# Patient Record
Sex: MALE | Race: ASIAN | NOT HISPANIC OR LATINO | Employment: FULL TIME | URBAN - METROPOLITAN AREA
[De-identification: names, ages, dates, MRNs, and addresses within clinical notes are randomized per-mention and may not be internally consistent; named-entity substitution may affect disease eponyms.]

---

## 2023-10-05 ENCOUNTER — HOSPITAL ENCOUNTER (EMERGENCY)
Facility: HOSPITAL | Age: 38
Discharge: HOME | End: 2023-10-05
Attending: EMERGENCY MEDICINE | Admitting: EMERGENCY MEDICINE
Payer: COMMERCIAL

## 2023-10-05 VITALS
DIASTOLIC BLOOD PRESSURE: 78 MMHG | TEMPERATURE: 97.9 F | SYSTOLIC BLOOD PRESSURE: 118 MMHG | HEART RATE: 79 BPM | OXYGEN SATURATION: 96 % | RESPIRATION RATE: 16 BRPM

## 2023-10-05 DIAGNOSIS — M54.9 MUSCULOSKELETAL BACK PAIN: ICD-10-CM

## 2023-10-05 PROBLEM — T79.A29A: Status: ACTIVE | Noted: 2023-10-05

## 2023-10-05 PROBLEM — L72.3 SEBACEOUS CYST: Status: ACTIVE | Noted: 2023-10-05

## 2023-10-05 PROBLEM — G57.50 TARSAL TUNNEL SYNDROME: Status: ACTIVE | Noted: 2023-10-05

## 2023-10-05 PROBLEM — R22.9 SUBCUTANEOUS NODULE: Status: ACTIVE | Noted: 2023-10-05

## 2023-10-05 PROBLEM — L70.0 ACNE VULGARIS: Status: ACTIVE | Noted: 2023-10-05

## 2023-10-05 PROBLEM — E55.9 VITAMIN D DEFICIENCY: Status: ACTIVE | Noted: 2023-10-05

## 2023-10-05 PROBLEM — M79.673 PAIN OF FOOT: Status: ACTIVE | Noted: 2023-10-05

## 2023-10-05 PROBLEM — R00.0 TACHYCARDIA: Status: ACTIVE | Noted: 2023-10-05

## 2023-10-05 PROBLEM — F17.200 NICOTINE DEPENDENCE: Status: ACTIVE | Noted: 2023-10-05

## 2023-10-05 PROBLEM — R20.0 NUMBNESS: Status: ACTIVE | Noted: 2023-10-05

## 2023-10-05 PROBLEM — E66.3 OVERWEIGHT: Status: ACTIVE | Noted: 2023-10-05

## 2023-10-05 PROBLEM — L90.6 STRIAE ATROPHICAE: Status: ACTIVE | Noted: 2023-10-05

## 2023-10-05 PROBLEM — M79.606 PAIN OF LOWER EXTREMITY: Status: ACTIVE | Noted: 2023-10-05

## 2023-10-05 PROBLEM — M79.A29 NON-TRAUMATIC COMPARTMENT SYNDROME OF LOWER EXTREMITY: Status: ACTIVE | Noted: 2023-10-05

## 2023-10-05 PROBLEM — H52.229 REGULAR ASTIGMATISM: Status: ACTIVE | Noted: 2023-10-05

## 2023-10-05 PROBLEM — F17.200 CURRENT SMOKER: Status: ACTIVE | Noted: 2023-10-05

## 2023-10-05 PROBLEM — L98.9 DISORDER OF SKIN: Status: ACTIVE | Noted: 2023-10-05

## 2023-10-05 PROBLEM — M21.40 ACQUIRED PES PLANUS: Status: ACTIVE | Noted: 2023-10-05

## 2023-10-05 PROBLEM — H52.10 MYOPIA: Status: ACTIVE | Noted: 2023-10-05

## 2023-10-05 PROBLEM — K52.9 GASTROENTERITIS: Status: ACTIVE | Noted: 2023-10-05

## 2023-10-05 PROCEDURE — 99283 EMERGENCY DEPT VISIT LOW MDM: CPT

## 2023-10-05 RX ORDER — CYCLOBENZAPRINE HCL 10 MG
10 TABLET ORAL 3 TIMES DAILY PRN
Qty: 15 TABLET | Refills: 0 | Status: SHIPPED | OUTPATIENT
Start: 2023-10-05

## 2023-10-05 ASSESSMENT — ENCOUNTER SYMPTOMS
FEVER: 0
NAUSEA: 0

## 2023-10-05 ASSESSMENT — ACTIVITIES OF DAILY LIVING (ADL): ADLS_ACUITY_SCORE: 33

## 2023-10-05 NOTE — ED PROVIDER NOTES
"  Emergency Department Encounter     Evaluation Date & Time:   10/5/2023  4:38 AM    CHIEF COMPLAINT:  Back Pain and Leg Pain      Triage Note:Pt arrives to triage for back pain that radiates down right leg. Pain began on Tuesday. Pt reports pain 8/10 when pain \"flares\". Pt is alert and oriented, calm and cooperative.             FINAL IMPRESSION:    ICD-10-CM    1. Musculoskeletal back pain  M54.9           Impression and Plan     ED COURSE & MEDICAL DECISION MAKIN:37 AM I met with the patient, obtained history, performed an initial exam, and discussed options and plan for diagnostics and treatment here in the ED.   ED Course as of 10/05/23 0636   Thu Oct 05, 2023   0611 He has pain that when he rotates his body and turns over in bed will cause shooting pains down his leg from his groin area.  However, he has no associated GI symptoms to suggest anything like appendicitis or diverticulitis.  He also though I would consider hernia has no pain at all when I palpate his inguinal area and so since I cannot recreate the pain in that area or feel a defect this seems unlikely to be a hernia at this point.  He does have good circulation in the leg so again does not seem consistent with peripheral arterial disease or DVT as there is also no swelling or persistent discomfort in that leg.  No pain with straight leg raise, no pain with palpation over the SI joint or consistent radicular pain in his right leg.  This makes a pinched nerve less likely though certainly is still possible but he does not have any associated weakness with it and certainly no persistent severe pain so again no MRI imaging is indicated today.  X-rays would not be helpful as there is no trauma to suggest a fracture nor midline pain with palpation over his bones.  He has no fever to suggest transverse myelitis.  There is no swelling or pain over his hip area to suggest a bursitis.  No masses are palpated, no skin changes to suggest a rash or " "shingles.  No further work-up is indicated in the emergency department today.  Will treat based on symptoms for likely muscle strain and/or spasm.  He was advised to continue gentle walking to keep the area loose, put a pillow under his legs at nighttime to sleep, he can use over-the-counter muscle pain patches to help as well as warm compress to keep the muscles loose and will continue ibuprofen as tolerated, will additionally give prescription for muscle relaxer with precautions.  He does not have a primary care physician so referral was made.       At the conclusion of the encounter I discussed the results of all the tests and the disposition. The questions were answered. The patient or family acknowledged understanding and was agreeable with the care plan.          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    The patient presents with \"shooting\" pains from his RLQ/right hip down his right leg when he rolls and moves his right leg in certain ways. His pain got noticeably worse on 10/3 and is 8/10 now when the pain \"flares\". He thinks that this pain was aggravated from standing on his feet all day on 10/3. He has been taking ibuprofen with no relief. He has had lower back pain in the past but this is worse. He does not think that he has a hernia and has not noticed overlying skin changes. He does not take blood thinners. He did have a fasciotomy on his right lower leg ~10 years ago but denies other surgeries. " He does not smoke, does not have any other medical issues, and does not take daily medications. He denies fever, nausea, or any other complaints at this time.       REVIEW OF SYSTEMS:  Review of Systems   Constitutional:  Negative for fever.   Gastrointestinal:  Negative for nausea.   Musculoskeletal:         Positive for right leg pain     remainder of systems are all otherwise negative.        Medical History     History reviewed. No pertinent past medical history.    History reviewed. No pertinent surgical history.    History reviewed. No pertinent family history.         cyclobenzaprine (FLEXERIL) 10 MG tablet        Physical Exam     First Vitals:  Patient Vitals for the past 24 hrs:   BP Temp Temp src Pulse Resp SpO2   10/05/23 0434 119/79 97.9  F (36.6  C) Temporal 79 16 96 %       PHYSICAL EXAM:   Constitutional:   Sitting upright, conversive.   HENT:  Normocephalic, posterior pharynx wnl, mucous membranes moist and pink   Eyes:  PERRL, EOMI, Conjunctiva normal, No discharge, no scleral icterus.  Respiratory:  Breathing easily, lungs clear to auscultation  Cardiovascular:  RRR nl s1s2 0 murmurs, rubs, or gallops.  Peripheral pulses dp, pt, and radial are wnl.  No peripheral edema   GI:  Bowel sounds normal, Soft, No tenderness, No flank tenderness, nondistended.  :No CVA tenderness.   Musculoskeletal:  Moves all extremities.  No erythematous or swollen major joints, no pain over right inguinal hernia, midline spine nontender, right hip including over greater trochanter nontender.   Integument:  Normal color.  Lymphatic:  No cervical lymphadenopathy  Neurologic:  Alert & oriented x 3, Normal motor function, Normal sensory function, No focal deficits noted. Normal speech.  Cn 2-12 intact.  Straight leg raise is negative on r, patient stands from sitting without weakness or exacerbation of pain.  No foot drop.  Ambulation in room is wnl.  Light touch sensation intact throughout leg (left in his  shoes)  Psychiatric:  Affect normal, Judgment normal, Mood normal.     Results     LAB AND RADIOLOGY:  All pertinent labs reviewed and interpreted  No results found for any visits on 10/05/23.      PROCEDURES:  Procedures:      [facility] Documentation     Medical Decision Making    History:  Supplemental history from: Documented in chart, if applicable  External Record(s) reviewed: Documented in chart, if applicable.    Work Up:  Chart documentation includes differential considered and any EKGs or imaging independently interpreted by provider, where specified.  In additional to work up documented, I considered the following work up: Documented in chart, if applicable.    External consultation:  Discussion of management with another provider: Documented in chart, if applicable    Complicating factors:  Care impacted by chronic illness: N/A  Care affected by social determinants of health: N/A    Disposition considerations: Discharge. No recommendations on prescription strength medication(s). See documentation for any additional details.      The creation of this record is based on the scribe s observations of the work being performed by Jethro Fish and the provider s statements to them. This document has been checked and approved by MD Marine Nelson MD  Emergency Medicine  [facility] EMERGENCY DEPARTMENT         Marine Calvillo MD  10/05/23 0638

## 2023-10-05 NOTE — ED TRIAGE NOTES
"Pt arrives to triage for back pain that radiates down right leg. Pain began on Tuesday. Pt reports pain 8/10 when pain \"flares\". Pt is alert and oriented, calm and cooperative.         "

## 2023-10-05 NOTE — DISCHARGE INSTRUCTIONS
Continue ibuprofen for pain if your stomach tolerates.  A good dose is 600mg every 6 hours as needed.  Take with food.    Use over the counter muscle pain patches if they help.    When you do not have a patch on, use warm compresses to keep the muscles loose.  Do this frequently during the day.    Keep moving but avoid heavy lifting or significant exertion/strain.    A pillow under your knees when sleeping on your back is helpful to relieve strain, or one between your knees when lying on your side.

## 2023-10-05 NOTE — Clinical Note
Nayeli Rain was seen and treated in our emergency department on 10/5/2023.         Sincerely,     [facility] Emergency Department